# Patient Record
Sex: FEMALE | Race: WHITE | Employment: FULL TIME | URBAN - METROPOLITAN AREA
[De-identification: names, ages, dates, MRNs, and addresses within clinical notes are randomized per-mention and may not be internally consistent; named-entity substitution may affect disease eponyms.]

---

## 2021-07-14 RX ORDER — OMEPRAZOLE 10 MG/1
10 CAPSULE, DELAYED RELEASE ORAL DAILY
COMMUNITY

## 2021-07-14 RX ORDER — FENOFIBRATE 40 MG/1
TABLET ORAL
Status: ON HOLD | COMMUNITY
End: 2021-07-15

## 2021-07-14 RX ORDER — LISINOPRIL 20 MG/1
TABLET ORAL DAILY
Status: ON HOLD | COMMUNITY
End: 2021-07-15

## 2021-07-15 ENCOUNTER — ANESTHESIA EVENT (OUTPATIENT)
Dept: SURGERY | Age: 56
End: 2021-07-15
Payer: COMMERCIAL

## 2021-07-15 ENCOUNTER — ANESTHESIA (OUTPATIENT)
Dept: SURGERY | Age: 56
End: 2021-07-15
Payer: COMMERCIAL

## 2021-07-15 ENCOUNTER — HOSPITAL ENCOUNTER (OUTPATIENT)
Age: 56
Discharge: HOME | End: 2021-07-15
Attending: SURGERY | Admitting: SURGERY
Payer: COMMERCIAL

## 2021-07-15 VITALS
SYSTOLIC BLOOD PRESSURE: 118 MMHG | TEMPERATURE: 97.5 F | HEART RATE: 107 BPM | BODY MASS INDEX: 35.85 KG/M2 | RESPIRATION RATE: 16 BRPM | WEIGHT: 210 LBS | HEIGHT: 64 IN | OXYGEN SATURATION: 96 % | DIASTOLIC BLOOD PRESSURE: 58 MMHG

## 2021-07-15 PROBLEM — K64.9 HEMORRHOIDS: Status: ACTIVE | Noted: 2021-07-15

## 2021-07-15 LAB
COVID-19 RAPID TEST, COVR: NOT DETECTED
POTASSIUM SERPL-SCNC: 3.6 MMOL/L (ref 3.5–5.1)
SPECIMEN SOURCE: NORMAL

## 2021-07-15 PROCEDURE — 77030031753 HC SHR ENDO COAG HARM J&J -E: Performed by: SURGERY

## 2021-07-15 PROCEDURE — 2709999900 HC NON-CHARGEABLE SUPPLY: Performed by: SURGERY

## 2021-07-15 PROCEDURE — 84132 ASSAY OF SERUM POTASSIUM: CPT

## 2021-07-15 PROCEDURE — 74011000250 HC RX REV CODE- 250: Performed by: SURGERY

## 2021-07-15 PROCEDURE — 87635 SARS-COV-2 COVID-19 AMP PRB: CPT

## 2021-07-15 PROCEDURE — 74011000250 HC RX REV CODE- 250: Performed by: NURSE ANESTHETIST, CERTIFIED REGISTERED

## 2021-07-15 PROCEDURE — 88304 TISSUE EXAM BY PATHOLOGIST: CPT

## 2021-07-15 PROCEDURE — 74011250636 HC RX REV CODE- 250/636: Performed by: NURSE ANESTHETIST, CERTIFIED REGISTERED

## 2021-07-15 PROCEDURE — 77030014007 HC SPNG HEMSTAT J&J -B: Performed by: SURGERY

## 2021-07-15 PROCEDURE — 74011250637 HC RX REV CODE- 250/637: Performed by: SURGERY

## 2021-07-15 PROCEDURE — 76060000033 HC ANESTHESIA 1 TO 1.5 HR: Performed by: SURGERY

## 2021-07-15 PROCEDURE — 74011250636 HC RX REV CODE- 250/636: Performed by: SURGERY

## 2021-07-15 PROCEDURE — 76010000149 HC OR TIME 1 TO 1.5 HR: Performed by: SURGERY

## 2021-07-15 PROCEDURE — 76210000022 HC REC RM PH II 1.5 TO 2 HR: Performed by: SURGERY

## 2021-07-15 PROCEDURE — 76210000063 HC OR PH I REC FIRST 0.5 HR: Performed by: SURGERY

## 2021-07-15 RX ORDER — NORETHINDRONE AND ETHINYL ESTRADIOL 0.5-0.035
5 KIT ORAL AS NEEDED
Status: DISCONTINUED | OUTPATIENT
Start: 2021-07-15 | End: 2021-07-15 | Stop reason: HOSPADM

## 2021-07-15 RX ORDER — FUROSEMIDE 20 MG/1
20 TABLET ORAL
COMMUNITY

## 2021-07-15 RX ORDER — PROPOFOL 10 MG/ML
INJECTION, EMULSION INTRAVENOUS AS NEEDED
Status: DISCONTINUED | OUTPATIENT
Start: 2021-07-15 | End: 2021-07-15 | Stop reason: HOSPADM

## 2021-07-15 RX ORDER — FENOFIBRATE 160 MG/1
160 TABLET ORAL DAILY
COMMUNITY

## 2021-07-15 RX ORDER — BROMPHENIRAMINE MALEATE, DEXTROMETHORPHAN HBR, PHENYLEPHRINE HCL, DIPHENHYDRAMINE HCL, PHENYLEPHRINE HCL 0.52G
1 KIT ORAL DAILY
COMMUNITY

## 2021-07-15 RX ORDER — LABETALOL HCL 20 MG/4 ML
5 SYRINGE (ML) INTRAVENOUS
Status: DISCONTINUED | OUTPATIENT
Start: 2021-07-15 | End: 2021-07-15 | Stop reason: HOSPADM

## 2021-07-15 RX ORDER — FENTANYL CITRATE 50 UG/ML
50 INJECTION, SOLUTION INTRAMUSCULAR; INTRAVENOUS
Status: DISCONTINUED | OUTPATIENT
Start: 2021-07-15 | End: 2021-07-15 | Stop reason: HOSPADM

## 2021-07-15 RX ORDER — SODIUM CHLORIDE 0.9 % (FLUSH) 0.9 %
5-40 SYRINGE (ML) INJECTION AS NEEDED
Status: DISCONTINUED | OUTPATIENT
Start: 2021-07-15 | End: 2021-07-15 | Stop reason: HOSPADM

## 2021-07-15 RX ORDER — ONDANSETRON 2 MG/ML
INJECTION INTRAMUSCULAR; INTRAVENOUS AS NEEDED
Status: DISCONTINUED | OUTPATIENT
Start: 2021-07-15 | End: 2021-07-15 | Stop reason: HOSPADM

## 2021-07-15 RX ORDER — ROCURONIUM BROMIDE 10 MG/ML
INJECTION, SOLUTION INTRAVENOUS AS NEEDED
Status: DISCONTINUED | OUTPATIENT
Start: 2021-07-15 | End: 2021-07-15 | Stop reason: HOSPADM

## 2021-07-15 RX ORDER — ZOLPIDEM TARTRATE 5 MG/1
5 TABLET ORAL
COMMUNITY

## 2021-07-15 RX ORDER — HYDRALAZINE HYDROCHLORIDE 20 MG/ML
10 INJECTION INTRAMUSCULAR; INTRAVENOUS
Status: DISCONTINUED | OUTPATIENT
Start: 2021-07-15 | End: 2021-07-15 | Stop reason: HOSPADM

## 2021-07-15 RX ORDER — MIDAZOLAM HYDROCHLORIDE 1 MG/ML
0.5 INJECTION, SOLUTION INTRAMUSCULAR; INTRAVENOUS
Status: DISCONTINUED | OUTPATIENT
Start: 2021-07-15 | End: 2021-07-15 | Stop reason: HOSPADM

## 2021-07-15 RX ORDER — OXYCODONE AND ACETAMINOPHEN 5; 325 MG/1; MG/1
1 TABLET ORAL
Status: COMPLETED | OUTPATIENT
Start: 2021-07-15 | End: 2021-07-15

## 2021-07-15 RX ORDER — DIPHENHYDRAMINE HYDROCHLORIDE 50 MG/ML
12.5 INJECTION, SOLUTION INTRAMUSCULAR; INTRAVENOUS AS NEEDED
Status: DISCONTINUED | OUTPATIENT
Start: 2021-07-15 | End: 2021-07-15 | Stop reason: HOSPADM

## 2021-07-15 RX ORDER — ONDANSETRON 2 MG/ML
4 INJECTION INTRAMUSCULAR; INTRAVENOUS AS NEEDED
Status: DISCONTINUED | OUTPATIENT
Start: 2021-07-15 | End: 2021-07-15 | Stop reason: HOSPADM

## 2021-07-15 RX ORDER — LIDOCAINE HYDROCHLORIDE 20 MG/ML
INJECTION, SOLUTION EPIDURAL; INFILTRATION; INTRACAUDAL; PERINEURAL AS NEEDED
Status: DISCONTINUED | OUTPATIENT
Start: 2021-07-15 | End: 2021-07-15 | Stop reason: HOSPADM

## 2021-07-15 RX ORDER — BUPIVACAINE HYDROCHLORIDE 2.5 MG/ML
INJECTION, SOLUTION EPIDURAL; INFILTRATION; INTRACAUDAL AS NEEDED
Status: DISCONTINUED | OUTPATIENT
Start: 2021-07-15 | End: 2021-07-15 | Stop reason: HOSPADM

## 2021-07-15 RX ORDER — ATORVASTATIN CALCIUM 80 MG/1
80 TABLET, FILM COATED ORAL
COMMUNITY

## 2021-07-15 RX ORDER — METOPROLOL TARTRATE 5 MG/5ML
2.5 INJECTION INTRAVENOUS
Status: DISCONTINUED | OUTPATIENT
Start: 2021-07-15 | End: 2021-07-15 | Stop reason: HOSPADM

## 2021-07-15 RX ORDER — SODIUM CHLORIDE 0.9 % (FLUSH) 0.9 %
5-40 SYRINGE (ML) INJECTION EVERY 8 HOURS
Status: DISCONTINUED | OUTPATIENT
Start: 2021-07-15 | End: 2021-07-15 | Stop reason: HOSPADM

## 2021-07-15 RX ORDER — HYDROCODONE BITARTRATE AND ACETAMINOPHEN 5; 325 MG/1; MG/1
1 TABLET ORAL AS NEEDED
Status: DISCONTINUED | OUTPATIENT
Start: 2021-07-15 | End: 2021-07-15 | Stop reason: HOSPADM

## 2021-07-15 RX ORDER — MIDAZOLAM HYDROCHLORIDE 1 MG/ML
INJECTION, SOLUTION INTRAMUSCULAR; INTRAVENOUS AS NEEDED
Status: DISCONTINUED | OUTPATIENT
Start: 2021-07-15 | End: 2021-07-15 | Stop reason: HOSPADM

## 2021-07-15 RX ORDER — DEXAMETHASONE SODIUM PHOSPHATE 4 MG/ML
INJECTION, SOLUTION INTRA-ARTICULAR; INTRALESIONAL; INTRAMUSCULAR; INTRAVENOUS; SOFT TISSUE AS NEEDED
Status: DISCONTINUED | OUTPATIENT
Start: 2021-07-15 | End: 2021-07-15 | Stop reason: HOSPADM

## 2021-07-15 RX ORDER — LEVOTHYROXINE SODIUM 150 UG/1
150 TABLET ORAL
COMMUNITY

## 2021-07-15 RX ORDER — MIDAZOLAM HYDROCHLORIDE 1 MG/ML
1 INJECTION, SOLUTION INTRAMUSCULAR; INTRAVENOUS AS NEEDED
Status: DISCONTINUED | OUTPATIENT
Start: 2021-07-15 | End: 2021-07-15 | Stop reason: HOSPADM

## 2021-07-15 RX ORDER — SODIUM CHLORIDE, SODIUM LACTATE, POTASSIUM CHLORIDE, CALCIUM CHLORIDE 600; 310; 30; 20 MG/100ML; MG/100ML; MG/100ML; MG/100ML
20 INJECTION, SOLUTION INTRAVENOUS CONTINUOUS
Status: DISCONTINUED | OUTPATIENT
Start: 2021-07-15 | End: 2021-07-15 | Stop reason: HOSPADM

## 2021-07-15 RX ORDER — FENTANYL CITRATE 50 UG/ML
INJECTION, SOLUTION INTRAMUSCULAR; INTRAVENOUS AS NEEDED
Status: DISCONTINUED | OUTPATIENT
Start: 2021-07-15 | End: 2021-07-15 | Stop reason: HOSPADM

## 2021-07-15 RX ORDER — DOCUSATE SODIUM 100 MG/1
100 CAPSULE, LIQUID FILLED ORAL 2 TIMES DAILY
COMMUNITY

## 2021-07-15 RX ORDER — HYDROMORPHONE HYDROCHLORIDE 1 MG/ML
0.4 INJECTION, SOLUTION INTRAMUSCULAR; INTRAVENOUS; SUBCUTANEOUS
Status: DISCONTINUED | OUTPATIENT
Start: 2021-07-15 | End: 2021-07-15 | Stop reason: HOSPADM

## 2021-07-15 RX ORDER — DESVENLAFAXINE SUCCINATE 50 MG/1
50 TABLET, EXTENDED RELEASE ORAL
COMMUNITY

## 2021-07-15 RX ORDER — LIDOCAINE HYDROCHLORIDE 10 MG/ML
0.1 INJECTION, SOLUTION EPIDURAL; INFILTRATION; INTRACAUDAL; PERINEURAL AS NEEDED
Status: DISCONTINUED | OUTPATIENT
Start: 2021-07-15 | End: 2021-07-15 | Stop reason: HOSPADM

## 2021-07-15 RX ORDER — LISINOPRIL AND HYDROCHLOROTHIAZIDE 20; 25 MG/1; MG/1
1 TABLET ORAL DAILY
COMMUNITY

## 2021-07-15 RX ORDER — FENTANYL CITRATE 50 UG/ML
50 INJECTION, SOLUTION INTRAMUSCULAR; INTRAVENOUS AS NEEDED
Status: DISCONTINUED | OUTPATIENT
Start: 2021-07-15 | End: 2021-07-15 | Stop reason: HOSPADM

## 2021-07-15 RX ADMIN — PHENYLEPHRINE HYDROCHLORIDE 100 MCG: 10 INJECTION INTRAVENOUS at 09:32

## 2021-07-15 RX ADMIN — MIDAZOLAM HYDROCHLORIDE 2 MG: 2 INJECTION, SOLUTION INTRAMUSCULAR; INTRAVENOUS at 09:02

## 2021-07-15 RX ADMIN — Medication 2 G: at 09:17

## 2021-07-15 RX ADMIN — PHENYLEPHRINE HYDROCHLORIDE 100 MCG: 10 INJECTION INTRAVENOUS at 09:56

## 2021-07-15 RX ADMIN — SUGAMMADEX 200 MG: 100 INJECTION, SOLUTION INTRAVENOUS at 10:19

## 2021-07-15 RX ADMIN — DEXAMETHASONE SODIUM PHOSPHATE 8 MG: 4 INJECTION, SOLUTION INTRA-ARTICULAR; INTRALESIONAL; INTRAMUSCULAR; INTRAVENOUS; SOFT TISSUE at 09:23

## 2021-07-15 RX ADMIN — PROPOFOL 200 MG: 10 INJECTION, EMULSION INTRAVENOUS at 09:10

## 2021-07-15 RX ADMIN — PHENYLEPHRINE HYDROCHLORIDE 100 MCG: 10 INJECTION INTRAVENOUS at 09:26

## 2021-07-15 RX ADMIN — OXYCODONE HYDROCHLORIDE AND ACETAMINOPHEN 1 TABLET: 5; 325 TABLET ORAL at 12:22

## 2021-07-15 RX ADMIN — PHENYLEPHRINE HYDROCHLORIDE 100 MCG: 10 INJECTION INTRAVENOUS at 09:39

## 2021-07-15 RX ADMIN — ROCURONIUM BROMIDE 50 MG: 50 INJECTION, SOLUTION INTRAVENOUS at 09:10

## 2021-07-15 RX ADMIN — SODIUM CHLORIDE, POTASSIUM CHLORIDE, SODIUM LACTATE AND CALCIUM CHLORIDE 20 ML/HR: 600; 310; 30; 20 INJECTION, SOLUTION INTRAVENOUS at 08:20

## 2021-07-15 RX ADMIN — ONDANSETRON 4 MG: 2 INJECTION INTRAMUSCULAR; INTRAVENOUS at 09:56

## 2021-07-15 RX ADMIN — LIDOCAINE HYDROCHLORIDE 100 MG: 20 INJECTION, SOLUTION EPIDURAL; INFILTRATION; INTRACAUDAL; PERINEURAL at 09:10

## 2021-07-15 RX ADMIN — FENTANYL CITRATE 100 MCG: 50 INJECTION, SOLUTION INTRAMUSCULAR; INTRAVENOUS at 09:10

## 2021-07-15 NOTE — OP NOTES
[facility]  OPERATIVE REPORT    Name:  Agueda White  MR#:  [id number]  :  1965  ACCOUNT #:  [de-identified]  DATE OF SERVICE:  07/15/2021    PREOPERATIVE DIAGNOSES:  Rectal bleed, hemorrhoids, and hemorrhoidal tag. POSTOPERATIVE DIAGNOSES:  Rectal bleed, hemorrhoids, and hemorrhoidal tag. PROCEDURES PERFORMED:  1. Right anterolateral hemorrhoidectomy using a Focus Harmonic scalpel. 2.  Excision of right anterolateral hemorrhoidal tag.  3.  Right posterolateral hemorrhoidectomy using a Focus Harmonic scalpel. 4.  Excision of right posterolateral hemorrhoidal tag. 5.  Left lateral hemorrhoidectomy using a Focus Harmonic scalpel. 6.  Excision of left lateral hemorrhoidal tag using a Focus Harmonic scalpel. SURGEON:  Lisa Carr MD    ASSISTANTS:  Ms. Primo Gastelum and Ms. Walls. ANESTHESIA:  General anesthesia/local anesthesia. ANESTHESIOLOGIST:  Dr. Kiley Murphy. NURSE ANESTHETIST:  Ms. Jesenia Mccullough. COMPLICATIONS:  There were no known complications. SPECIMENS REMOVED: as above    IMPLANTS:  None    ESTIMATED BLOOD LOSS:  Minimal.    INDICATIONS:  The patient is a 51-year-old female who presented to my office with history of profuse bleeding on and off for the past several years and in the last few weeks she has been bleeding almost everyday and also having some itching, pain, discomfort due to large third-degree hemorrhoids and hemorrhoidal tag. At this time point, I recommended hemorrhoidectomy. Risks, benefits, and complications were discussed and consent obtained. She also had a colonoscopy which showed large hemorrhoids. DESCRIPTION OF PROCEDURE:  The patient was taken to the operating room. The patient was laid supine. The patient received prophylactic dose of antibiotic. The patient had TEDs and SCDs applied on both lower extremities.   After intubation, the patient was carefully placed in dorsal lithotomy position with both legs in candy-cane stirrups. The anal area was prepped and draped in the usual sterile fashion. Time-out was completed. A bivalve anal speculum was introduced and the anal canal was carefully inspected. The patient had a very large hemorrhoidal tag in all the three quadrants plus the patient also had large third-degree hemorrhoids involving all the three quadrants. The anal canal, otherwise, looked okay. We started with the right anterolateral hemorrhoid. Local anesthesia was infiltrated in the intersphincteric groove. The mucocutaneous junction was grasped with an Allis clamp. The right anterolateral hemorrhoid was grasped with a Vanessa clamp. Using a Focus Harmonic scalpel, the right anterolateral hemorrhoid was completely excised. Complete hemostasis was achieved. Then, the right anterolateral hemorrhoidal tag was also excised using a Focus Harmonic scalpel. Our attention was turned to the right posterolateral hemorrhoid. Once again, the right posterolateral hemorrhoid was grasped with a Vanessa clamp and the mucocutaneous junction was grasped with an Allis clamp. Using a Focus Harmonic scalpel, the right posterolateral hemorrhoid was excised. Complete hemostasis was achieved. The right posterolateral hemorrhoidal tag was also excised with a Harmonic scalpel as well. Attention was turned to the left lateral hemorrhoid. The left lateral hemorrhoid was grasped with a Vanessa clamp while the mucocutaneous junction was grasped with an Allis clamp. Using a Focus Harmonic scalpel, the left lateral hemorrhoid was completely excised. Complete hemostasis was achieved. The left lateral hemorrhoidal tag was excised with a Harmonic scalpel as well. The patient tolerated the procedure very well. After that, Gelfoam soaked in Xylocaine jelly was placed intra-anally. A 4x4 dressing and mesh panty were applied. The patient was then extubated and transferred to the recovery room in stable condition.   All counts were correct.       MD BROOKE [id number]  D:  07/15/2021 11:02  T:  07/15/2021 18:47  JOB #:  [id number]  CC:  MD Trey Goldstein NP

## 2021-07-15 NOTE — BRIEF OP NOTE
Brief Postoperative Note    Patient: Sadiq Mitchell  YOB: 1965  MRN: [id number]    Date of Procedure: 7/15/2021     Pre-Op Diagnosis: RECTAL BLEEDING, HEMORRHOIDS    Post-Op Diagnosis: Same as preoperative diagnosis. Procedure(s): HEMORRHOIDECTOMY AND EXCISION OF HEMORROIDAL TAG  (URGENT)    Surgeon(s):  Mark Ruggiero MD    Surgical Assistant: Ms. Glo Rhoades/Ms. Feliz    Anesthesia: General / local    Estimated Blood Loss (mL): Minimal    Complications: None    Specimens:   ID Type Source Tests Collected by Time Destination   1 : right post lateral hemorroid  Preservative Anus  Loyda Motta MD 7/15/2021 0945 Pathology   2 : right rebecca lateral hemorroid Preservative Anus  Loyda Motta MD 7/15/2021 8067 Pathology   3 : left lateral hemorroid Preservative Anus  Loyda Motta MD 7/15/2021 3729 Pathology   4 : right rebecca  lateral hemorroidal tag  Preservative Anus  Mark Ruggiero MD 7/15/2021 1003 Pathology   5 : left lateral hemorrodal tag  Preservative Anus  Mark Ruggiero MD 7/15/2021 1007 Pathology   6 : right post lateral hemorroidal tag  Preservative Anus  Mark Ruggiero MD 7/15/2021 1007 Pathology        Implants: None  Drains: None    Findings: as above    Electronically Signed by Othelia Lesches, MD on 7/15/2021 at 10:35 AM

## 2021-07-15 NOTE — DISCHARGE INSTRUCTIONS
Patient Education     Sitz Bath BID  High Fiber Diet   No Lifting weight greater than 20 lbs or strenuous activity for two weeks      Hemorrhoidectomy: What to Expect at [facility]     After you have hemorrhoids removed, you can expect to feel better each day. Your anal area will be painful or ache for 2 to 4 weeks. And you may need pain medicine. It is common to have some light bleeding and clear or yellow fluids from your anus. This is most likely when you have a bowel movement. These symptoms may last for 1 to 2 months after surgery. After 1 to 2 weeks, you should be able to do most of your normal activities. But don't do things that require a lot of effort. It is important to avoid heavy lifting and straining with bowel movements while you recover. This care sheet gives you a general idea about how long it will take for you to recover. But each person recovers at a different pace. Follow the steps below to get better as quickly as possible. How can you care for yourself at home? Activity    · Rest when you feel tired.     · Be active. Walking is a good choice.     · Allow your body to heal. Don't move quickly or lift anything heavy until you are feeling better.     · You may take showers and baths as usual. Pat your anal area dry when you are done.     · You will probably need to take 1 to 2 weeks off work. It depends on the type of work you do and how you feel. Diet    · Follow your doctor's instructions about eating after surgery.     · Start adding high-fiber foods to your diet 2 or 3 days after your surgery. This will make bowel movements easier. And it lowers the chance that you will get hemorrhoids again.     · If your bowel movements are not regular right after surgery, try to avoid constipation and straining. Drink plenty of water. Your doctor may suggest fiber, a stool softener, or a mild laxative. Medications    · Your doctor will tell you if and when you can restart your medicines.  He or she will also give you instructions about taking any new medicines.     · If you take aspirin or some other blood thinner, ask your doctor if and when to start taking it again. Make sure that you understand exactly what your doctor wants you to do.     · Be safe with medicines. Read and follow all instructions on the label. ? If the doctor gave you a prescription medicine for pain, take it as prescribed. ? If you are not taking a prescription pain medicine, ask your doctor if you can take an over-the-counter medicine.     · If your doctor prescribed antibiotics, take them as directed. Do not stop taking them just because you feel better. You need to take the full course of antibiotics.     · You may apply numbing medicines before and after bowel movements to relieve pain. Other instructions    · Sit in a few inches of warm water (sitz bath) for 15 to 20 minutes 3 times a day and after bowel movements. Then pat the area dry. Do this as long as you have pain in your anal area.     · Avoid sitting on the toilet for long periods of time or straining during bowel movements.     · Keep your anal area clean.     · Support your feet with a small step stool when you sit on the toilet. This helps flex your hips and places your pelvis in a squatting position. This can make bowel movements easier after surgery.     · Use baby wipes or medicated pads, such as Tucks, instead of toilet paper after a bowel movement. These products do not irritate the anus.     · If your doctor recommends it, use an over-the-counter hydrocortisone cream on the skin in your anal area. This can reduce pain and itching after surgery.     · Apply ice several times a day for 10 minutes at a time.     · Try lying on your stomach with a pillow under your hips to decrease swelling. Follow-up care is a key part of your treatment and safety. Be sure to make and go to all appointments, and call your doctor if you are having problems.  It's also a good idea to know your test results and keep a list of the medicines you take. When should you call for help? Call 911 anytime you think you may need emergency care. For example, call if:    · You passed out (lost consciousness).     · You are short of breath. Call your doctor now or seek immediate medical care if:    · You have signs of infection, such as:  ? Increased pain, swelling, warmth, or redness. ? Red streaks leading from the area. ? Pus draining from the area. ? A fever.     · You have pain that does not get better after you take your pain medicine.     · You are sick to your stomach and cannot keep fluids down.     · You have signs of a blood clot in your leg (called a deep vein thrombosis), such as:  ? Pain in your calf, back of the knee, thigh, or groin. ? Redness and swelling in your leg or groin.     · You cannot pass stools or gas. Watch closely for changes in your health, and be sure to contact your doctor if you have any problems. Where can you learn more? Go to http://www.gray.com/  Enter C417 in the search box to learn more about \"Hemorrhoidectomy: What to Expect at Home. \"  Current as of: April 15, 2020               Content Version: 12.8  © 2006-2021 Healthwise, [employer]. Care instructions adapted under license by [employer] (which disclaims liability or warranty for this information). If you have questions about a medical condition or this instruction, always ask your healthcare professional. [place] [address] any warranty or liability for your use of this information. Patient Education        Hemorrhoidectomy: What to Expect at Home  Your Recovery     After you have hemorrhoids removed, you can expect to feel better each day. Your anal area will be painful or ache for 2 to 4 weeks. And you may need pain medicine. It is common to have some light bleeding and clear or yellow fluids from your anus.  This is most likely when you have a bowel movement. These symptoms may last for 1 to 2 months after surgery. After 1 to 2 weeks, you should be able to do most of your normal activities. But don't do things that require a lot of effort. It is important to avoid heavy lifting and straining with bowel movements while you recover. This care sheet gives you a general idea about how long it will take for you to recover. But each person recovers at a different pace. Follow the steps below to get better as quickly as possible. How can you care for yourself at home? Activity    · Rest when you feel tired.     · Be active. Walking is a good choice.     · Allow your body to heal. Don't move quickly or lift anything heavy until you are feeling better.     · You may take showers and baths as usual. Pat your anal area dry when you are done.     · You will probably need to take 1 to 2 weeks off work. It depends on the type of work you do and how you feel. Diet    · Follow your doctor's instructions about eating after surgery.     · Start adding high-fiber foods to your diet 2 or 3 days after your surgery. This will make bowel movements easier. And it lowers the chance that you will get hemorrhoids again.     · If your bowel movements are not regular right after surgery, try to avoid constipation and straining. Drink plenty of water. Your doctor may suggest fiber, a stool softener, or a mild laxative. Medications    · Your doctor will tell you if and when you can restart your medicines. He or she will also give you instructions about taking any new medicines.     · If you take aspirin or some other blood thinner, ask your doctor if and when to start taking it again. Make sure that you understand exactly what your doctor wants you to do.     · Be safe with medicines. Read and follow all instructions on the label. ? If the doctor gave you a prescription medicine for pain, take it as prescribed.   ? If you are not taking a prescription pain medicine, ask your doctor if you can take an over-the-counter medicine.     · If your doctor prescribed antibiotics, take them as directed. Do not stop taking them just because you feel better. You need to take the full course of antibiotics.     · You may apply numbing medicines before and after bowel movements to relieve pain. Other instructions    · Sit in a few inches of warm water (sitz bath) for 15 to 20 minutes 3 times a day and after bowel movements. Then pat the area dry. Do this as long as you have pain in your anal area.     · Avoid sitting on the toilet for long periods of time or straining during bowel movements.     · Keep your anal area clean.     · Support your feet with a small step stool when you sit on the toilet. This helps flex your hips and places your pelvis in a squatting position. This can make bowel movements easier after surgery.     · Use baby wipes or medicated pads, such as Tucks, instead of toilet paper after a bowel movement. These products do not irritate the anus.     · If your doctor recommends it, use an over-the-counter hydrocortisone cream on the skin in your anal area. This can reduce pain and itching after surgery.     · Apply ice several times a day for 10 minutes at a time.     · Try lying on your stomach with a pillow under your hips to decrease swelling. Follow-up care is a key part of your treatment and safety. Be sure to make and go to all appointments, and call your doctor if you are having problems. It's also a good idea to know your test results and keep a list of the medicines you take. When should you call for help? Call 911 anytime you think you may need emergency care. For example, call if:    · You passed out (lost consciousness).     · You are short of breath. Call your doctor now or seek immediate medical care if:    · You have signs of infection, such as:  ? Increased pain, swelling, warmth, or redness. ? Red streaks leading from the area. ?  Pus draining from the area. ? A fever.     · You have pain that does not get better after you take your pain medicine.     · You are sick to your stomach and cannot keep fluids down.     · You have signs of a blood clot in your leg (called a deep vein thrombosis), such as:  ? Pain in your calf, back of the knee, thigh, or groin. ? Redness and swelling in your leg or groin.     · You cannot pass stools or gas. Watch closely for changes in your health, and be sure to contact your doctor if you have any problems. Where can you learn more? Go to http://www.gray.com/  Enter C417 in the search box to learn more about \"Hemorrhoidectomy: What to Expect at Home. \"  Current as of: April 15, 2020               Content Version: 12.8  © 5475-8246 Healthwise[employer]. Care instructions adapted under license by [employer] (which disclaims liability or warranty for this information). If you have questions about a medical condition or this instruction, always ask your healthcare professional. [place] [address] any warranty or liability for your use of this information.

## 2021-07-15 NOTE — PROGRESS NOTES
Dr. Ainsley Altman came to assess pt in SDS, he stated to put gideon 4x4 and mesh underwear on patient. Patient complaint of pressure, but did not want to go to the restroom. Dr. Ainsley Altman said after assessment that can put an order in to straight cath patient. Patient stated is afraid to go to the bathroom. Straight cath kit obtained, Patient stated will try to go to the bathroom. Patient offered pain medicine percocet and tylenol. Patient stated will take one time perocet.

## 2021-07-15 NOTE — ANESTHESIA PREPROCEDURE EVALUATION
Relevant Problems   No relevant active problems       Anesthetic History   No history of anesthetic complications            Review of Systems / Medical History  Patient summary reviewed, nursing notes reviewed and pertinent labs reviewed    Pulmonary  Within defined limits                 Neuro/Psych   Within defined limits           Cardiovascular    Hypertension          Hyperlipidemia    Exercise tolerance: >4 METS     GI/Hepatic/Renal     GERD           Endo/Other      Hypothyroidism  Obesity     Other Findings            Past Medical History:   Diagnosis Date    GERD (gastroesophageal reflux disease)     High cholesterol     Hypertension     Hypothyroidism        Past Surgical History:   Procedure Laterality Date    HX COLONOSCOPY      HX GYN      2015 hysterectomy    HX ORTHOPAEDIC      lumbar fusion 2019    HX OTHER SURGICAL      tummy tuck       Current Outpatient Medications   Medication Instructions    atorvastatin (LIPITOR) 80 mg, Oral, EVERY BEDTIME    desvenlafaxine succinate (PRISTIQ) 50 mg, Oral, EVERY BEDTIME    DICLOFENAC SODIUM PO 50 mg, Oral, 2 TIMES DAILY    docusate sodium (COLACE) 100 mg, Oral, 2 TIMES DAILY    fenofibrate (LOFIBRA) 160 mg, Oral, DAILY    furosemide (LASIX) 20 mg, Oral, DAILY AS NEEDED    levothyroxine (SYNTHROID) 150 mcg, Oral, DAILY BEFORE BREAKFAST    lisinopril-hydroCHLOROthiazide (PRINZIDE, ZESTORETIC) 20-25 mg per tablet 1 Tablet, Oral, DAILY    omeprazole (PRILOSEC) 10 mg, Oral, DAILY    psyllium (MetamuciL) 0.52 gram capsule 1 Capsule, Oral, DAILY    zolpidem (AMBIEN) 5 mg, Oral, BEDTIME PRN       Current Facility-Administered Medications   Medication Dose Route Frequency    lactated Ringers infusion  20 mL/hr IntraVENous CONTINUOUS    cefOXitin (MEFOXIN) 2 g in sterile water (preservative free) 20 mL iv syringe  2 g IntraVENous ONCE       Patient Vitals for the past 24 hrs:   Temp Pulse Resp BP SpO2   07/15/21 0810 36.5 °C (97.7 °F) 96 16 113/65 100 %       No results found for: WBC, WBCLT, HGBPOC, HGB, HGBP, HCTPOC, HCT, PHCT, RBCH, PLT, MCV, HGBEXT, HCTEXT, PLTEXT  No results found for: NA, K, CL, CO2, AGAP, GLU, BUN, CREA, BUCR, GFRAA, GFRNA, CA, GFRAA  No results found for: APTT, PTP, INR, INREXT  No results found for: GLU, GLUCPOC  Physical Exam    Airway  Mallampati: I  TM Distance: 4 - 6 cm  Neck ROM: normal range of motion   Mouth opening: Normal     Cardiovascular    Rhythm: regular  Rate: normal         Dental  No notable dental hx       Pulmonary  Breath sounds clear to auscultation               Abdominal  GI exam deferred       Other Findings            Anesthetic Plan    ASA: 2  Anesthesia type: general          Induction: Intravenous  Anesthetic plan and risks discussed with: Patient and Family

## 2021-09-05 NOTE — ANESTHESIA POSTPROCEDURE EVALUATION
Procedure(s): HEMORRHOIDECTOMY AND EXCISION OF HEMORROIDAL TAG  (URGENT).     general    Anesthesia Post Evaluation      Multimodal analgesia: multimodal analgesia used between 6 hours prior to anesthesia start to PACU discharge  Patient location during evaluation: PACU  Patient participation: complete - patient participated  Level of consciousness: awake  Pain score: 0  Pain management: adequate  Airway patency: patent  Anesthetic complications: no  Cardiovascular status: acceptable  Respiratory status: acceptable  Hydration status: acceptable  Post anesthesia nausea and vomiting:  controlled  Final Post Anesthesia Temperature Assessment:  Normothermia (36.0-37.5 degrees C)      INITIAL Post-op Vital signs:   Vitals Value Taken Time   /59 07/15/21 1045   Temp 36.3 °C (97.3 °F) 07/15/21 1045   Pulse 104 07/15/21 1045   Resp 17 07/15/21 1045   SpO2 100 % 07/15/21 1045

## (undated) DEVICE — JELLY LUBRICATING 2 OZ SCREW-CAP MTL TUBE STRL SURGLUB

## (undated) DEVICE — REM POLYHESIVE ADULT PATIENT RETURN ELECTRODE: Brand: VALLEYLAB

## (undated) DEVICE — TUBING, SUCTION, 1/4" X 12', STRAIGHT: Brand: MEDLINE

## (undated) DEVICE — UNDERPANTS INCONT XL 45-70IN KNIT SEAMLESS DSGN COLOR-CODED

## (undated) DEVICE — MINOR VAGINAL PACK: Brand: MEDLINE INDUSTRIES, INC.

## (undated) DEVICE — STERILE POLYISOPRENE POWDER-FREE SURGICAL GLOVES WITH EMOLLIENT COATING: Brand: PROTEXIS

## (undated) DEVICE — STERILE POLYISOPRENE POWDER-FREE SURGICAL GLOVES: Brand: PROTEXIS

## (undated) DEVICE — INTENDED FOR TISSUE SEPARATION, AND OTHER PROCEDURES THAT REQUIRE A SHARP SURGICAL BLADE TO PUNCTURE OR CUT.: Brand: BARD-PARKER ® CARBON RIB-BACK BLADES

## (undated) DEVICE — SHEAR HARMONIC FOCUS OEM 9CM --

## (undated) DEVICE — SYR 10ML LUER LOK 1/5ML GRAD --

## (undated) DEVICE — WET SKIN PREP TRAY: Brand: MEDLINE INDUSTRIES, INC.

## (undated) DEVICE — SURGIFOAM SPNG SZ 100

## (undated) DEVICE — YANKAUER,BULB TIP,VENTED,RIGID,STERILE: Brand: MEDLINE INDUSTRIES, INC.

## (undated) DEVICE — PREP PAD BNS: Brand: CONVERTORS

## (undated) DEVICE — SOUTHSIDE TURNOVER: Brand: MEDLINE INDUSTRIES, INC.

## (undated) DEVICE — BASIC SINGLE BASIN-LF: Brand: MEDLINE INDUSTRIES, INC.

## (undated) DEVICE — NEEDLE HYPO 25GA L1.5IN BVL ORIENTED ECLIPSE

## (undated) DEVICE — SYRINGE IRRIG 60ML SFT PLIABLE BLB EZ TO GRP 1 HND USE W/

## (undated) DEVICE — ROCKER SWITCH PENCIL BLADE ELECTRODE, HOLSTER: Brand: EDGE